# Patient Record
(demographics unavailable — no encounter records)

---

## 2025-07-16 NOTE — PHYSICAL EXAM
[FreeTextEntry1] : Exam today out of the splint swelling and tenderness to the distal radius quite significant no clinical deformity compartments are soft neurovascular status is intact  Review of x-rays right wrist 3 views [facility] July 15, 2025 revealed a well aligned fracture of the distal radius

## 2025-07-16 NOTE — CONSULT LETTER
[Dear  ___] : Dear  [unfilled], [Consult Letter:] : I had the pleasure of evaluating your patient, [unfilled]. [Please see my note below.] : Please see my note below. [Consult Closing:] : Thank you very much for allowing me to participate in the care of this patient.  If you have any questions, please do not hesitate to contact me. [Sincerely,] : Sincerely, [FreeTextEntry3] : Dr Larry Wise JR.

## 2025-07-16 NOTE — ASSESSMENT
[FreeTextEntry1] : Impression: Fracture right distal radius.  This child has been placed into a molded short arm fiberglass cast uneventfully.  I discussed cast care and activities with the child and mother he will return in approximately 4 weeks for x-rays of the wrist and likely removal of the cast at that time

## 2025-07-16 NOTE — HISTORY OF PRESENT ILLNESS
[FreeTextEntry1] : This 11-year-old healthy child with normal development is seen today for evaluation of the right wrist he was well until this past weekend when he tripped and fell playing basketball sustaining injury.  He was placed into a splint at an urgent care center after x-rays revealed the fractures come from today's visit past history is noncontributory